# Patient Record
Sex: FEMALE | Race: WHITE | HISPANIC OR LATINO | URBAN - METROPOLITAN AREA
[De-identification: names, ages, dates, MRNs, and addresses within clinical notes are randomized per-mention and may not be internally consistent; named-entity substitution may affect disease eponyms.]

---

## 2021-05-05 ENCOUNTER — PREPPED CHART (OUTPATIENT)
Dept: URBAN - METROPOLITAN AREA CLINIC 50 | Facility: CLINIC | Age: 71
End: 2021-05-05

## 2021-05-05 ASSESSMENT — VISUAL ACUITY
OU_CC: J1+
OS_SC: 20/25
OD_SC: 20/25

## 2021-05-05 ASSESSMENT — TONOMETRY
OS_IOP_MMHG: 16
OD_IOP_MMHG: 16

## 2021-05-07 ENCOUNTER — NEW PATIENT COMPREHENSIVE (OUTPATIENT)
Dept: URBAN - METROPOLITAN AREA CLINIC 50 | Facility: CLINIC | Age: 71
End: 2021-05-07

## 2021-05-07 DIAGNOSIS — H35.373: ICD-10-CM

## 2021-05-07 DIAGNOSIS — H10.013: ICD-10-CM

## 2021-05-07 PROCEDURE — 92004 COMPRE OPH EXAM NEW PT 1/>: CPT

## 2021-05-07 PROCEDURE — 92134 CPTRZ OPH DX IMG PST SGM RTA: CPT

## 2021-05-07 RX ORDER — FLUOROMETHOLONE 1 MG/ML
1 SUSPENSION/ DROPS OPHTHALMIC ONCE A DAY
End: 2021-05-07

## 2021-05-07 ASSESSMENT — KERATOMETRY
OD_AXISANGLE2_DEGREES: 25
OD_K2POWER_DIOPTERS: 45.50
OS_K2POWER_DIOPTERS: 45.75
OS_AXISANGLE_DEGREES: 16
OD_K1POWER_DIOPTERS: 46.50
OS_AXISANGLE2_DEGREES: 106
OS_K1POWER_DIOPTERS: 46.00
OD_AXISANGLE_DEGREES: 115

## 2021-05-07 ASSESSMENT — VISUAL ACUITY
OD_SC: 20/25-2
OS_SC: 20/30-2
OD_GLARE: 20/40
OU_SC: J5@14IN
OD_GLARE: 20/60
OU_SC: 20/25-1

## 2021-05-07 ASSESSMENT — TONOMETRY
OS_IOP_MMHG: 15
OD_IOP_MMHG: 15

## 2021-11-23 NOTE — PATIENT DISCUSSION
VF today full OD; possible sup. arcuate defect OS. Defect could be from lid drooping; will tape upper lids for next VF.

## 2022-05-26 NOTE — PROCEDURE NOTE: CLINICAL
PROCEDURE NOTE: Punctal Plugs, 0.5 mm Quintess Dissolvable (51552W, ) #1 Left Lower Lid. Prior to treatment, the risks/benefits/alternatives were discussed. The patient wished to proceed with procedure. Temporary collagen plugs were inserted. Patient tolerated procedure well. There were no complications. Post procedure instructions given. Allen Rodriguez PROCEDURE NOTE: Punctal Plugs, 0.4 mm Quintess Dissolvable (63849W, ) #1 Right Lower Lid. Prior to treatment, the risks/benefits/alternatives were discussed. The patient wished to proceed with procedure. Temporary collagen plugs were inserted. Patient tolerated procedure well. There were no complications. Post procedure instructions given. Allen Rodriguez
Quality 265: Biopsy Follow-Up: Biopsy results reviewed, communicated, tracked, and documented
Quality 130: Documentation Of Current Medications In The Medical Record: Current Medications Documented
Detail Level: Detailed
Quality 131: Pain Assessment And Follow-Up: Pain assessment using a standardized tool is documented as negative, no follow-up plan required
Quality 474: Zoster Vaccination Status: Shingrix Vaccination not Administered or Previously Received, Reason not Otherwise Specified
Additional Notes: P0

## 2022-11-03 NOTE — PATIENT DISCUSSION
Discussed condition and exacerbating conditions/situations (e.g., dry/arid environments, overhead fans, air conditioners, side effect of medications). 3

## 2025-06-06 ENCOUNTER — NEW PATIENT (OUTPATIENT)
Age: 75
End: 2025-06-06

## 2025-06-06 DIAGNOSIS — H52.4: ICD-10-CM

## 2025-06-06 DIAGNOSIS — H43.813: ICD-10-CM

## 2025-06-06 DIAGNOSIS — H25.813: ICD-10-CM

## 2025-06-06 PROCEDURE — 99204 OFFICE O/P NEW MOD 45 MIN: CPT

## 2025-06-06 PROCEDURE — 92015 DETERMINE REFRACTIVE STATE: CPT
